# Patient Record
Sex: FEMALE | Race: WHITE | NOT HISPANIC OR LATINO | Employment: STUDENT | URBAN - METROPOLITAN AREA
[De-identification: names, ages, dates, MRNs, and addresses within clinical notes are randomized per-mention and may not be internally consistent; named-entity substitution may affect disease eponyms.]

---
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
## 2025-03-26 DIAGNOSIS — E66.01 SEVERE OBESITY DUE TO EXCESS CALORIES WITHOUT SERIOUS COMORBIDITY WITH BODY MASS INDEX (BMI) GREATER THAN 99TH PERCENTILE FOR AGE IN PEDIATRIC PATIENT (H): ICD-10-CM

## 2025-03-26 DIAGNOSIS — T73.0XXD HUNGER, SUBSEQUENT ENCOUNTER: ICD-10-CM

## 2025-03-27 RX ORDER — PHENTERMINE HYDROCHLORIDE 37.5 MG/1
TABLET ORAL
Qty: 30 TABLET | Refills: 3 | Status: SHIPPED | OUTPATIENT
Start: 2025-03-27

## 2025-04-22 ENCOUNTER — VIRTUAL VISIT (OUTPATIENT)
Dept: PEDIATRICS | Facility: CLINIC | Age: 20
End: 2025-04-22
Payer: COMMERCIAL

## 2025-04-22 VITALS — BODY MASS INDEX: 29.95 KG/M2 | WEIGHT: 180 LBS

## 2025-04-22 DIAGNOSIS — E55.9 VITAMIN D DEFICIENCY: ICD-10-CM

## 2025-04-22 DIAGNOSIS — E66.01 SEVERE OBESITY DUE TO EXCESS CALORIES WITHOUT SERIOUS COMORBIDITY WITH BODY MASS INDEX (BMI) GREATER THAN 99TH PERCENTILE FOR AGE IN PEDIATRIC PATIENT (H): Primary | ICD-10-CM

## 2025-04-22 DIAGNOSIS — L68.0 HIRSUTISM: ICD-10-CM

## 2025-04-22 DIAGNOSIS — E28.2 PCOS (POLYCYSTIC OVARIAN SYNDROME): ICD-10-CM

## 2025-04-22 DIAGNOSIS — E78.00 HYPERCHOLESTEROLEMIA: ICD-10-CM

## 2025-04-22 DIAGNOSIS — T73.0XXD HUNGER, SUBSEQUENT ENCOUNTER: ICD-10-CM

## 2025-04-22 RX ORDER — PHENTERMINE HYDROCHLORIDE 37.5 MG/1
37.5 TABLET ORAL
Qty: 30 TABLET | Refills: 3 | Status: SHIPPED | OUTPATIENT
Start: 2025-04-22

## 2025-04-22 RX ORDER — TOPIRAMATE 50 MG/1
TABLET, FILM COATED ORAL
Qty: 180 TABLET | Refills: 3 | Status: SHIPPED | OUTPATIENT
Start: 2025-04-22

## 2025-04-22 RX ORDER — SPIRONOLACTONE 50 MG/1
100 TABLET, FILM COATED ORAL DAILY
Qty: 60 TABLET | Refills: 11 | Status: SHIPPED | OUTPATIENT
Start: 2025-04-22

## 2025-04-22 NOTE — PATIENT INSTRUCTIONS
Thank you for choosing [facility]. It was a pleasure to see you for your office visit today.      If you have any questions or scheduling needs during regular office hours, please call: [phone]    If urgent concerns arise after hours, you can call [phone] and ask to speak to the pediatric specialist on call.     If you need to schedule Imaging/Radiology tests, please call: [phone]    Roadmap messages are for routine communication and questions and are usually answered within 48-72 hours. If you have an urgent concern or require sooner response, please call us.    Outside lab and imaging results should be faxed to [phone].  If you go to a lab outside of [facility] we will not automatically get those results. You will need to ask to have them faxed.     You may receive a survey regarding your experience with the clinic today. We would appreciate your feedback.     We encourage to you make your follow-up today to ensure a timely appointment. If you are unable to do so please reach out to [phone] as soon as possible.     Food Goal:  a.  Will have a fruit instead of a granola bar for a snack  b.  Will have a vegetable with dinner at least twice a week  c.  Continue good water intake     2.  Activity Goal:  Will continue to be active. Increase jogging to 3-4 times a week.     3.  Medications:   Continue phentermine 37.5 mg daily  Continue topiramate 50 mg twice a day.      PCOS: Continue following with OB/GYN     Continue vitamin D 1000 international unit(s) daily.      6.   We will plan to see you again in clinic in around 4 months. If you need anything in the interim (medication refills, questions, etc.) please us us know.     If you had any blood work, imaging or other tests completed today:  Normal test results will be mailed to your home address in a letter.  Abnormal results will be communicated to you via phone call/letter.  Please allow up to 1-2 weeks for processing and  interpretation of most lab work.

## 2025-04-22 NOTE — NURSING NOTE
Jessica Boland complains of    Chief Complaint   Patient presents with    Video Visit     WM follow up     Home weight reported: 180 lbs 0 oz    Patient would like the video invitation sent by: Cici     Patient is located in [place]? Yes     I have reviewed and updated the patient's medication list, allergies and preferred pharmacy.  TIARA [place]

## 2025-04-22 NOTE — PROCEDURES
Jessica is a 20 year old who is being evaluated via a billable video visit.    How would you like to obtain your AVS? MyChart  If the video visit is dropped, the invitation should be resent by: Text to cell phone: [phone]  Will anyone else be joining your video visit? No    Video-Visit Details    Type of service:  Video Visit   Video start time: 2:35 PM  Video End Time: 2:50 PM  Originating Location (pt. Location): Home    Distant Location (provider location):  On-site  Platform used for Video Visit: Dani  Signed Electronically by: Norberto Seals MD

## 2025-04-22 NOTE — PROGRESS NOTES
Date: 2025    PATIENT:  Jessica Boland  :          2005  STONE:          2025    Dear Dr. Kelley Ref-Primary, Physician:    I had the pleasure of seeing your patient, Jessica Boland, for a follow-up visit in the [facility] on 2025 at the [facility] in [place].  Jessica was last seen in this clinic 2024.  Please see below for my assessment and plan of care.    Interval History:    As you may recall, Jessica is a 20 year old female with a history of class 2 pediatric obesity (defined as BMI 1.2-1.4 times the 95th percentile), whom I am seeing today for follow up.     Initial consult weight was 219 pounds on 11/3/2020.  Weight at last visit on 2024 was 181 pounds  Weight today is 180 pounds  Weight change since last seen on 2024 is down 1 pounds.   Total loss is 39 pounds.    The above said, when I first saw her in the general endocrine clinic for PCOS, her weight was 224 pounds. Therefore, since her initial visit with me she has lost 44 pounds.      Continues to remain on topiramate 50 mg twice a day and phentermine 37.5 mg daily. Overall continuing to help in terms of appetite reduction.     Dietary Recall:  Breakfast: options including fruit, granola bar, bagel with cream cheese  Lunch: generally not hungry; therefore may have something smaller like a granola bar  Dinner: options including burger, caesar salad with chicken  Snacks: generally has one snack a day  Drinks: generally does not have drinks with calories     In terms of physical activity, has been going on walks and runs. Currently a sophomore at [employer]; also working 2 jobs.         Current Medications:  Current Outpatient Rx   Medication Sig Dispense Refill    albuterol (PROAIR HFA/PROVENTIL HFA/VENTOLIN HFA) 108 (90 Base) MCG/ACT inhaler Inhale 2 puffs into the lungs every 6 hours      ammonium lactate (AMLACTIN) 12 % external cream Apply  "topically daily To the arms and legs 385 g 3    benzoyl peroxide 5 % topical gel Apply topically daily In the shower to the thighs, butt, underarms and rinse off after a few minutes 90 g 11    Carboxymethylcellulose Sodium (EYE DROPS OP) Apply to eye as needed       cetirizine (ZYRTEC) 10 MG tablet Take 10 mg by mouth as needed       cholecalciferol (VITAMIN D3) 25 mcg (1000 units) capsule Take 1 capsule (25 mcg) by mouth daily. 30 capsule 11    clindamycin (CLEOCIN T) 1 % external lotion Apply topically daily As needed for red bumps on the legs 120 mL 0    fluticasone (FLONASE) 50 MCG/ACT nasal spray Spray 1 spray into both nostrils as needed       levonorgestrel (MIRENA) 52 MG (20 mcg/day) IUD by Intrauterine route once      phentermine (ADIPEX-P) 37.5 MG tablet TAKE 1 TABLET(37.5 MG) BY MOUTH EVERY MORNING 30 tablet 3    spironolactone (ALDACTONE) 50 MG tablet Take 2 tablets (100 mg) by mouth daily. 60 tablet 11    SYMBICORT 80-4.5 MCG/ACT Inhaler INHALE 2 PUFFS BY MOUTH TWICE DAILY      topiramate (TOPAMAX) 50 MG tablet Take 1 tab (50 mg) twice a day. 180 tablet 3    triamcinolone (KENALOG) 0.1 % external ointment Apply topically 2 times daily To the rash on the right hand as needed 30 g 0       Physical Exam:    Weight today is 180 pounds    Measured Weights:  Wt Readings from Last 4 Encounters:   11/12/24 82.1 kg (181 lb) (95%, Z= 1.62)*   12/05/23 83.9 kg (185 lb) (96%, Z= 1.73)*   08/01/23 85.8 kg (189 lb 2.5 oz) (97%, Z= 1.81)*   11/29/22 79.4 kg (175 lb) (95%, Z= 1.60)*     * Growth percentiles are based on CDC (Girls, 2-20 Years) data.     Height:    Ht Readings from Last 4 Encounters:   12/05/23 1.651 m (5' 5\") (61%, Z= 0.29)*   08/01/23 1.65 m (5' 4.96\") (61%, Z= 0.28)*   01/25/22 1.65 m (5' 4.96\") (63%, Z= 0.33)*   12/29/21 1.64 m (5' 4.57\") (57%, Z= 0.17)*     * Growth percentiles are based on [facility] (Girls, 2-20 Years) data.     GENERAL: Healthy, alert and no distress  EYES: Eyes grossly normal to " inspection.    HENT: Normal cephalic/atraumatic.  External ears, nose and mouth without ulcers or lesions.  No nasal drainage visible.  RESP: No audible wheeze, cough.  No visible retractions or increased work of breathing.    MS: No gross musculoskeletal defects noted.  Normal range of motion.    SKIN: Visible skin clear. No significant rash, abnormal pigmentation or lesions.  NEURO: Cranial nerves grossly intact.  Mentation and speech appropriate for age.  PSYCH: Mentation appears normal, affect normal/bright, judgement and insight intact, normal speech and appearance well-groomed.    Labs:      Component      Latest Ref Rng 9/25/2020  9:28 AM 1/25/2022  4:03 PM   Sodium      133 - 144 mmol/L   141    Potassium      3.4 - 5.3 mmol/L   4.1    Chloride      96 - 110 mmol/L   109    Carbon Dioxide      20 - 32 mmol/L   26    Anion Gap      3 - 14 mmol/L   6    Urea Nitrogen      7 - 19 mg/dL   8    Creatinine      0.50 - 1.00 mg/dL   0.78    Calcium      9.1 - 10.3 mg/dL   8.9 (L)    Glucose      70 - 99 mg/dL   101 (H)    Alkaline Phosphatase      40 - 150 U/L   103    AST      0 - 35 U/L 19  27    ALT      0 - 50 U/L 28  29    Protein Total      6.8 - 8.8 g/dL   7.5    Albumin      3.4 - 5.0 g/dL   3.7    Bilirubin Total      0.2 - 1.3 mg/dL   0.7    GFR Estimate   --    Cholesterol      <170 mg/dL   193 (H)    Triglycerides      <90 mg/dL   88    HDL Cholesterol      >=50 mg/dL   64    LDL Cholesterol Calculated      <=110 mg/dL   111 (H)    Non HDL Cholesterol      <120 mg/dL   129 (H)    Patient Fasting?   Yes    Testosterone Total      0 - 75 ng/dL 22      Sex Hormone Binding Globulin      11 - 120 nmol/L 135 (H)      Free Testosterone Calculated      0.12 - 0.75 ng/dL 0.10 (L)      Hemoglobin A1C      0.0 - 5.6 % 5.1  5.1    DHEA Sulfate      ug/dL 223      Androstenedione      0.390 - 2.000 ng/mL 0.539      FSH      0.9 - 12.4 IU/L 3.8      Lutropin      0.5 - 20.7 IU/L 2.0      TSH      0.40 - 4.00 mU/L 1.08       T4 Free      0.76 - 1.46 ng/dL 1.25      Prolactin      3 - 27 ug/L 13      Beta-HCG Tumor Marker      IU/L <3      17-OH Progesterone      ng/dL 14      Vitamin D Deficiency screening      20 - 75 ug/L   48        Assessment:      Jessica is a 20 year old girl with a BMI previously in the class 2 pediatric obesity category (BMI between 1.2 and 1.4 times the 95th percentile), currently in the overweight category (defined as BMI 85th-95th percentile). Primary contributors to Jessica's weight status appear to include genetics (strong family history of obesity), strong hunger which may be due to a disorder in satiety regulation, overactive craving/reward pathways in the brain which manifests as a stong love of food, and a binge eating component to overeating (binge eating without loss of control). The foundation of treatment is behavioral modification to improve dietary and physical activity patterns.  In certain circumstances, more intensive interventions, such as psychotherapy and/or pharmacotherapy, are needed.       In addition to ongoing lifestyle modification therapy, she is currently on topiramate (started 11/2020) and phentermine (started 5/2021). Overall, doing well on the current regimen, and therefore will continue (topiramate 50 mg twice a day, phentermine 37.5 mg daily).      As for her history of PCOS, being followed by OB/GYN. Currently has Mirena      As for history of hypercholesterolemia, treatment at this time is ongoing dietary modifications. We will continue to follow over time. As for vitamin D deficiency, will continue vitamin D supplement.      Additional plans and goals, made through shared decision making, as outlined below.    Jessica s current problem list reviewed today includes:    Encounter Diagnoses   Name Primary?    Severe obesity due to excess calories without serious comorbidity with body mass index (BMI) greater than 99th percentile for age in pediatric patient (H) Yes    Hunger,  subsequent encounter     Hirsutism     PCOS (polycystic ovarian syndrome)     Hypercholesterolemia     Vitamin D deficiency         Care Plan:    Using motivational interviewing, Jessica made the following goals:  Patient Instructions   Thank you for choosing [facility]. It was a pleasure to see you for your office visit today.      If you have any questions or scheduling needs during regular office hours, please call: [phone]    If urgent concerns arise after hours, you can call [phone] and ask to speak to the pediatric specialist on call.     If you need to schedule Imaging/Radiology tests, please call: [phone]    Agensys messages are for routine communication and questions and are usually answered within 48-72 hours. If you have an urgent concern or require sooner response, please call us.    Outside lab and imaging results should be faxed to [phone].  If you go to a lab outside of [facility] we will not automatically get those results. You will need to ask to have them faxed.     You may receive a survey regarding your experience with the clinic today. We would appreciate your feedback.     We encourage to you make your follow-up today to ensure a timely appointment. If you are unable to do so please reach out to [phone] as soon as possible.     Food Goal:  a.  Will have a fruit instead of a granola bar for a snack  b.  Will have a vegetable with dinner at least twice a week  c.  Continue good water intake     2.  Activity Goal:  Will continue to be active. Increase jogging to 3-4 times a week.     3.  Medications:   Continue phentermine 37.5 mg daily  Continue topiramate 50 mg twice a day.      PCOS: Continue following with OB/GYN     Continue vitamin D 1000 international unit(s) daily.      6.   We will plan to see you again in clinic in around 4 months. If you need anything in the interim (medication refills, questions, etc.) please us us know.     If you had any blood  work, imaging or other tests completed today:  Normal test results will be mailed to your home address in a letter.  Abnormal results will be communicated to you via phone call/letter.  Please allow up to 1-2 weeks for processing and interpretation of most lab work.       We are looking forward to seeing Jessica for a follow-up visit in 4 months.    Thank you for including me in the care of your patient.  Please do not hesitate to call with questions or concerns.    Sincerely,    Norberto Seals MD MAS    Department of Pediatrics  Division of Pediatric Endocrinology  [facility] [phone]  [facility] [phone]    The longitudinal plan of care for the diagnosis(es)/condition(s) as documented were addressed during this visit. Due to the added complexity in care, I will continue to support Jessica in the subsequent management and with ongoing continuity of care.     I spent 20 minutes of total time, before, during, and after the visit reviewing previous labs and records, examining the patient, answering their questions, formulating and discussing the plan of care, reviewing resulted labs, and writing the visit note.

## 2025-07-19 ENCOUNTER — HEALTH MAINTENANCE LETTER (OUTPATIENT)
Age: 20
End: 2025-07-19

## 2025-08-06 ENCOUNTER — MYC MEDICAL ADVICE (OUTPATIENT)
Dept: PEDIATRICS | Facility: CLINIC | Age: 20
End: 2025-08-06
Payer: COMMERCIAL

## 2025-08-06 DIAGNOSIS — E66.01 SEVERE OBESITY DUE TO EXCESS CALORIES WITHOUT SERIOUS COMORBIDITY WITH BODY MASS INDEX (BMI) GREATER THAN 99TH PERCENTILE FOR AGE IN PEDIATRIC PATIENT (H): ICD-10-CM

## 2025-08-06 DIAGNOSIS — L68.0 HIRSUTISM: ICD-10-CM

## 2025-08-06 DIAGNOSIS — T73.0XXD HUNGER, SUBSEQUENT ENCOUNTER: ICD-10-CM

## 2025-08-07 RX ORDER — SPIRONOLACTONE 50 MG/1
100 TABLET, FILM COATED ORAL DAILY
Qty: 180 TABLET | Refills: 3 | Status: SHIPPED | OUTPATIENT
Start: 2025-08-07

## 2025-08-07 RX ORDER — PHENTERMINE HYDROCHLORIDE 37.5 MG/1
37.5 TABLET ORAL
Qty: 30 TABLET | Refills: 3 | Status: SHIPPED | OUTPATIENT
Start: 2025-08-07